# Patient Record
Sex: FEMALE | Race: BLACK OR AFRICAN AMERICAN | NOT HISPANIC OR LATINO | URBAN - NONMETROPOLITAN AREA
[De-identification: names, ages, dates, MRNs, and addresses within clinical notes are randomized per-mention and may not be internally consistent; named-entity substitution may affect disease eponyms.]

---

## 2022-05-31 ENCOUNTER — COMPREHENSIVE EXAM (OUTPATIENT)
Dept: URBAN - NONMETROPOLITAN AREA CLINIC 1 | Facility: CLINIC | Age: 27
End: 2022-05-31

## 2022-05-31 DIAGNOSIS — H52.13: ICD-10-CM

## 2022-05-31 PROCEDURE — S0621 ROUTINE OPHTHALMOLOGICAL EXA: HCPCS

## 2022-05-31 PROCEDURE — 92310 CONTACT LENS FITTING OU: CPT

## 2022-05-31 ASSESSMENT — TONOMETRY
OS_IOP_MMHG: 15
OD_IOP_MMHG: 17

## 2022-05-31 ASSESSMENT — VISUAL ACUITY
OS_SC: 20/125+2
OS_PH: 20/40-
OD_PH: 20/63

## 2022-05-31 ASSESSMENT — KERATOMETRY
OD_AXISANGLE2_DEGREES: 70
OD_K1POWER_DIOPTERS: 46.00
OS_K2POWER_DIOPTERS: 46.75
OS_AXISANGLE_DEGREES: 178
OD_AXISANGLE_DEGREES: 160
OD_K2POWER_DIOPTERS: 46.50
OS_AXISANGLE2_DEGREES: 88
OS_K1POWER_DIOPTERS: 45.50

## 2022-07-13 ENCOUNTER — FOLLOW UP (OUTPATIENT)
Dept: URBAN - NONMETROPOLITAN AREA CLINIC 1 | Facility: CLINIC | Age: 27
End: 2022-07-13

## 2022-07-13 DIAGNOSIS — H16.001: ICD-10-CM

## 2022-07-13 PROCEDURE — 99213 OFFICE O/P EST LOW 20 MIN: CPT

## 2022-07-13 RX ORDER — TOBRAMYCIN AND DEXAMETHASONE 1; 3 MG/ML; MG/ML: 1 SUSPENSION/ DROPS OPHTHALMIC

## 2022-07-13 ASSESSMENT — KERATOMETRY
OD_AXISANGLE2_DEGREES: 70
OD_AXISANGLE_DEGREES: 160
OS_K2POWER_DIOPTERS: 46.75
OS_AXISANGLE_DEGREES: 178
OS_AXISANGLE2_DEGREES: 88
OD_K1POWER_DIOPTERS: 46.00
OS_K1POWER_DIOPTERS: 45.50
OD_K2POWER_DIOPTERS: 46.50

## 2022-07-13 ASSESSMENT — VISUAL ACUITY
OS_CC: 20/20
OD_CC: 20/20

## 2022-07-13 ASSESSMENT — TONOMETRY: OS_IOP_MMHG: 16

## 2022-07-13 NOTE — PATIENT DISCUSSION
Discussed diagnosis in detail with patient. Ulcer is resolving. D/C CLS x 1 week. Start Tobradex TID OD x 7 days. RX sent to pharmacy. Continue to monitor.